# Patient Record
Sex: MALE | Race: ASIAN | NOT HISPANIC OR LATINO
[De-identification: names, ages, dates, MRNs, and addresses within clinical notes are randomized per-mention and may not be internally consistent; named-entity substitution may affect disease eponyms.]

---

## 2024-07-19 PROBLEM — Z00.00 ENCOUNTER FOR PREVENTIVE HEALTH EXAMINATION: Status: ACTIVE | Noted: 2024-07-19

## 2024-07-22 ENCOUNTER — APPOINTMENT (OUTPATIENT)
Dept: CARDIOLOGY | Facility: CLINIC | Age: 64
End: 2024-07-22

## 2024-07-22 VITALS
RESPIRATION RATE: 16 BRPM | DIASTOLIC BLOOD PRESSURE: 73 MMHG | HEIGHT: 66 IN | BODY MASS INDEX: 25.07 KG/M2 | OXYGEN SATURATION: 98 % | WEIGHT: 156 LBS | HEART RATE: 91 BPM | SYSTOLIC BLOOD PRESSURE: 113 MMHG

## 2024-07-22 DIAGNOSIS — R07.89 OTHER CHEST PAIN: ICD-10-CM

## 2024-07-22 PROCEDURE — 99204 OFFICE O/P NEW MOD 45 MIN: CPT

## 2024-07-22 PROCEDURE — 93000 ELECTROCARDIOGRAM COMPLETE: CPT

## 2024-07-22 PROCEDURE — G2211 COMPLEX E/M VISIT ADD ON: CPT

## 2024-07-22 NOTE — HISTORY OF PRESENT ILLNESS
[FreeTextEntry1] : 63 yo man PMHx of tobacco use disorder, DM2, HTN, and HLD who presents as new patient  ROS + substernal angina both at rest and with exertion x multiple years that has particularly worsened over the last few weeks. No dyspnea. No palpitations.   PMHx/PSHx as above FMHx no CV disease Social hx 1/2 pack use tobacco

## 2024-07-22 NOTE — DISCUSSION/SUMMARY
[EKG obtained to assist in diagnosis and management of assessed problem(s)] : EKG obtained to assist in diagnosis and management of assessed problem(s) [FreeTextEntry1] : 63 yo man PMHx of tobacco use disorder, DM2, HTN, and HLD who presents as new patient  EKG 7/22/24 NSR, no ischemic findings  Assessment 1. Cardiac chest pain 2. HTN 3. HLD 4. Tobacco use disorder  Plan 1. CT coronary IV contrast given high pretest probability of obs CAD (tobacco use + DM2) 2. TTE to r/o structural heart/valvular disease 3. DM2 + age 40-75 so c/w Lipitor 20mg PO QHS 4. Losartan 25mg PO QD given HTN + DM2 5. Emphasized the negative effect of tobacco use and pharmacotherapy options - patient declined  6. RTC 6 WEEKS  During non face-to-face time, I reviewed relevant portions of the patient's medical record. During face-to-face time, I took a relevant history and examined the patient. I also explained differential diagnoses, relevant cardiac diagnoses, workup, and management plan, which required a moderate level of medical decision making. I answered all questions related to the patient's medical conditions.   Mihir Connor M.D. Attending Cardiologist [facility]

## 2024-09-05 ENCOUNTER — APPOINTMENT (OUTPATIENT)
Dept: CARDIOLOGY | Facility: CLINIC | Age: 64
End: 2024-09-05
Payer: MEDICARE

## 2024-09-05 VITALS
SYSTOLIC BLOOD PRESSURE: 116 MMHG | HEART RATE: 81 BPM | DIASTOLIC BLOOD PRESSURE: 74 MMHG | RESPIRATION RATE: 18 BRPM | OXYGEN SATURATION: 98 %

## 2024-09-05 PROCEDURE — G2211 COMPLEX E/M VISIT ADD ON: CPT

## 2024-09-05 PROCEDURE — 99214 OFFICE O/P EST MOD 30 MIN: CPT

## 2024-09-05 PROCEDURE — 93306 TTE W/DOPPLER COMPLETE: CPT

## 2024-09-05 NOTE — DISCUSSION/SUMMARY
[FreeTextEntry1] : 65 yo man PMHx of tobacco use disorder, DM2, HTN, and HLD who presents as f/u  EKG 7/22/24 NSR, no ischemic findings  Assessment 1. Cardiac chest pain 2. HTN 3. HLD 4. Tobacco use disorder  Plan 1. CT coronary IV contrast given high pretest probability of obs CAD (tobacco use + DM2), currently scheduled for next week 2. TTE to r/o structural heart/valvular disease, done today, will review 3. DM2 + age 40-75 so c/w Lipitor 20mg PO QHS 4. Losartan 25mg PO QD given HTN + DM2 5. RTC 2mo  During non face-to-face time, I reviewed relevant portions of the patient's medical record. During face-to-face time, I took a relevant history and examined the patient. I also explained differential diagnoses, relevant cardiac diagnoses, workup, and management plan, which required a moderate level of medical decision making. I answered all questions related to the patient's medical conditions.   Mihir Connor M.D. Attending Cardiologist [facility]

## 2024-09-05 NOTE — HISTORY OF PRESENT ILLNESS
[FreeTextEntry1] : 65 yo man PMHx of tobacco use disorder, DM2, HTN, and HLD who presents as f/u  09/05/24 Continued typical angina as below 07/22/24 ROS + substernal angina both at rest and with exertion x multiple years that has particularly worsened over the last few weeks. No dyspnea. No palpitations.

## 2024-09-05 NOTE — DISCUSSION/SUMMARY
[FreeTextEntry1] : 63 yo man PMHx of tobacco use disorder, DM2, HTN, and HLD who presents as f/u  EKG 7/22/24 NSR, no ischemic findings  Assessment 1. Cardiac chest pain 2. HTN 3. HLD 4. Tobacco use disorder  Plan 1. CT coronary IV contrast given high pretest probability of obs CAD (tobacco use + DM2), currently scheduled for next week 2. TTE to r/o structural heart/valvular disease, done today, will review 3. DM2 + age 40-75 so c/w Lipitor 20mg PO QHS 4. Losartan 25mg PO QD given HTN + DM2 5. RTC 2mo  During non face-to-face time, I reviewed relevant portions of the patient's medical record. During face-to-face time, I took a relevant history and examined the patient. I also explained differential diagnoses, relevant cardiac diagnoses, workup, and management plan, which required a moderate level of medical decision making. I answered all questions related to the patient's medical conditions.   Mihir Connor M.D. Attending Cardiologist [facility]

## 2024-11-07 ENCOUNTER — APPOINTMENT (OUTPATIENT)
Dept: CARDIOLOGY | Facility: CLINIC | Age: 64
End: 2024-11-07

## 2024-11-21 ENCOUNTER — APPOINTMENT (OUTPATIENT)
Dept: CARDIOLOGY | Facility: CLINIC | Age: 64
End: 2024-11-21
Payer: MEDICARE

## 2024-11-21 VITALS
HEART RATE: 97 BPM | BODY MASS INDEX: 25.88 KG/M2 | OXYGEN SATURATION: 98 % | WEIGHT: 161 LBS | SYSTOLIC BLOOD PRESSURE: 119 MMHG | DIASTOLIC BLOOD PRESSURE: 79 MMHG | HEIGHT: 66 IN | RESPIRATION RATE: 16 BRPM

## 2024-11-21 PROCEDURE — 99214 OFFICE O/P EST MOD 30 MIN: CPT

## 2024-11-21 PROCEDURE — G2211 COMPLEX E/M VISIT ADD ON: CPT

## 2024-11-21 RX ORDER — NITROGLYCERIN 0.4 MG/1
0.4 TABLET SUBLINGUAL
Qty: 30 | Refills: 1 | Status: ACTIVE | COMMUNITY
Start: 2024-11-21 | End: 1900-01-01

## 2024-11-21 RX ORDER — ASPIRIN ENTERIC COATED TABLETS 81 MG 81 MG/1
81 TABLET, DELAYED RELEASE ORAL DAILY
Qty: 90 | Refills: 3 | Status: ACTIVE | COMMUNITY
Start: 2024-11-21 | End: 1900-01-01

## 2025-03-10 ENCOUNTER — APPOINTMENT (OUTPATIENT)
Dept: CARDIOLOGY | Facility: CLINIC | Age: 65
End: 2025-03-10